# Patient Record
Sex: MALE | Race: WHITE | URBAN - NONMETROPOLITAN AREA
[De-identification: names, ages, dates, MRNs, and addresses within clinical notes are randomized per-mention and may not be internally consistent; named-entity substitution may affect disease eponyms.]

---

## 2022-07-01 ENCOUNTER — OFFICE VISIT (OUTPATIENT)
Dept: URBAN - NONMETROPOLITAN AREA CLINIC 4 | Facility: CLINIC | Age: 47
End: 2022-07-01
Payer: COMMERCIAL

## 2022-07-01 ENCOUNTER — LAB OUTSIDE AN ENCOUNTER (OUTPATIENT)
Dept: URBAN - NONMETROPOLITAN AREA CLINIC 4 | Facility: CLINIC | Age: 47
End: 2022-07-01

## 2022-07-01 VITALS
DIASTOLIC BLOOD PRESSURE: 93 MMHG | HEART RATE: 84 BPM | BODY MASS INDEX: 33.9 KG/M2 | HEIGHT: 70 IN | WEIGHT: 236.8 LBS | SYSTOLIC BLOOD PRESSURE: 162 MMHG | TEMPERATURE: 97.5 F

## 2022-07-01 DIAGNOSIS — Z12.11 COLON CANCER SCREENING: ICD-10-CM

## 2022-07-01 PROCEDURE — 99203 OFFICE O/P NEW LOW 30 MIN: CPT | Performed by: REGISTERED NURSE

## 2022-07-01 RX ORDER — SODIUM, POTASSIUM,MAG SULFATES 17.5-3.13G
177ML SOLUTION, RECONSTITUTED, ORAL ORAL ONCE A DAY
Qty: 354 ML | Refills: 0 | OUTPATIENT
Start: 2022-07-01 | End: 2022-07-03

## 2022-07-01 RX ORDER — DULOXETINE HYDROCHLORIDE 60 MG/1
1 CAPSULE CAPSULE, DELAYED RELEASE ORAL ONCE A DAY
Status: ACTIVE | COMMUNITY

## 2022-07-01 RX ORDER — GABAPENTIN 300 MG/1
1 CAPSULE CAPSULE ORAL ONCE A DAY
Status: ACTIVE | COMMUNITY

## 2022-07-01 RX ORDER — ATORVASTATIN CALCIUM 10 MG/1
1 TABLET TABLET, FILM COATED ORAL ONCE A DAY
Status: ACTIVE | COMMUNITY

## 2022-07-01 NOTE — HPI-TODAY'S VISIT:
7/1/22: Pt is a 46 yo male with PMH of anxiety and depression who was referred by SHERICE Montalvo, for screening colonoscopy.  A copy of this document will be sent to the referring provider.  The patient presents for a colon cancer screening. There is no family history of colon polyps or cancer.  Patient reports alternating diarrhea and constipation. He has noticed blood on toilet paper when he wipes from suspected hemorrhoids. Denies abdominal pain or unintentional weight loss. Patient denies bleeding per rectum. Noton any blood thinners.

## 2022-07-25 ENCOUNTER — OFFICE VISIT (OUTPATIENT)
Dept: URBAN - METROPOLITAN AREA SURGERY CENTER 14 | Facility: SURGERY CENTER | Age: 47
End: 2022-07-25
Payer: COMMERCIAL

## 2022-07-25 ENCOUNTER — CLAIMS CREATED FROM THE CLAIM WINDOW (OUTPATIENT)
Dept: URBAN - METROPOLITAN AREA CLINIC 4 | Facility: CLINIC | Age: 47
End: 2022-07-25
Payer: COMMERCIAL

## 2022-07-25 DIAGNOSIS — D12.0 BENIGN NEOPLASM OF CECUM: ICD-10-CM

## 2022-07-25 DIAGNOSIS — Z12.11 COLON CANCER SCREENING: ICD-10-CM

## 2022-07-25 DIAGNOSIS — D12.0 ADENOMA OF CECUM: ICD-10-CM

## 2022-07-25 PROCEDURE — 88305 TISSUE EXAM BY PATHOLOGIST: CPT | Performed by: PATHOLOGY

## 2022-07-25 PROCEDURE — 45385 COLONOSCOPY W/LESION REMOVAL: CPT | Performed by: INTERNAL MEDICINE

## 2022-07-25 PROCEDURE — G8907 PT DOC NO EVENTS ON DISCHARG: HCPCS | Performed by: INTERNAL MEDICINE

## 2022-07-25 RX ORDER — DULOXETINE HYDROCHLORIDE 60 MG/1
1 CAPSULE CAPSULE, DELAYED RELEASE ORAL ONCE A DAY
Status: ACTIVE | COMMUNITY

## 2022-07-25 RX ORDER — GABAPENTIN 300 MG/1
1 CAPSULE CAPSULE ORAL ONCE A DAY
Status: ACTIVE | COMMUNITY

## 2022-07-25 RX ORDER — ATORVASTATIN CALCIUM 10 MG/1
1 TABLET TABLET, FILM COATED ORAL ONCE A DAY
Status: ACTIVE | COMMUNITY

## 2022-08-15 ENCOUNTER — WEB ENCOUNTER (OUTPATIENT)
Dept: URBAN - NONMETROPOLITAN AREA CLINIC 4 | Facility: CLINIC | Age: 47
End: 2022-08-15

## 2022-08-15 ENCOUNTER — OFFICE VISIT (OUTPATIENT)
Dept: URBAN - NONMETROPOLITAN AREA CLINIC 4 | Facility: CLINIC | Age: 47
End: 2022-08-15
Payer: COMMERCIAL

## 2022-08-15 VITALS
TEMPERATURE: 96.4 F | HEART RATE: 94 BPM | HEIGHT: 70 IN | WEIGHT: 240.4 LBS | SYSTOLIC BLOOD PRESSURE: 145 MMHG | BODY MASS INDEX: 34.41 KG/M2 | DIASTOLIC BLOOD PRESSURE: 86 MMHG

## 2022-08-15 DIAGNOSIS — Z86.010 PERSONAL HISTORY OF COLONIC POLYPS: ICD-10-CM

## 2022-08-15 DIAGNOSIS — D12.6 ADENOMATOUS POLYP OF COLON, UNSPECIFIED PART OF COLON: ICD-10-CM

## 2022-08-15 PROBLEM — 428283002: Status: ACTIVE | Noted: 2022-08-15

## 2022-08-15 PROCEDURE — 99213 OFFICE O/P EST LOW 20 MIN: CPT | Performed by: REGISTERED NURSE

## 2022-08-15 RX ORDER — ATORVASTATIN CALCIUM 10 MG/1
1 TABLET TABLET, FILM COATED ORAL ONCE A DAY
Status: ACTIVE | COMMUNITY

## 2022-08-15 RX ORDER — GABAPENTIN 300 MG/1
1 CAPSULE CAPSULE ORAL ONCE A DAY
Status: ACTIVE | COMMUNITY

## 2022-08-15 RX ORDER — DULOXETINE HYDROCHLORIDE 60 MG/1
1 CAPSULE CAPSULE, DELAYED RELEASE ORAL ONCE A DAY
Status: ACTIVE | COMMUNITY

## 2022-08-15 NOTE — HPI-TODAY'S VISIT:
7/1/22: Pt is a 48 yo male with PMH of anxiety and depression who was referred by SHERICE Montalvo, for screening colonoscopy.  A copy of this document will be sent to the referring provider.  The patient presents for a colon cancer screening. There is no family history of colon polyps or cancer.  Patient reports alternating diarrhea and constipation. He has noticed blood on toilet paper when he wipes from suspected hemorrhoids. Denies abdominal pain or unintentional weight loss. Patient denies bleeding per rectum. Noton any blood thinners.  8/15/22: Pt RTC for f/u. Had cscope 7/25/22: - Internal hemorrhoids. - One 4 mm polyp in the cecum, removed with a cold snare. Resected and retrieved. - The examined portion of the ileum was normal Bx c/w adenomatous polyp Denies any current GI complaints.

## 2022-08-22 NOTE — PHYSICAL EXAM CHEST:
chest wall non-tender, breathing is unlabored without accessory muscle use, normal breath sounds Klisyri Pregnancy And Lactation Text: It is unknown if this medication can harm a developing fetus or if it is excreted in breast milk.

## 2023-10-12 ENCOUNTER — OFFICE VISIT (OUTPATIENT)
Dept: URBAN - NONMETROPOLITAN AREA CLINIC 4 | Facility: CLINIC | Age: 48
End: 2023-10-12
Payer: COMMERCIAL

## 2023-10-12 VITALS
HEART RATE: 78 BPM | BODY MASS INDEX: 34.33 KG/M2 | DIASTOLIC BLOOD PRESSURE: 78 MMHG | SYSTOLIC BLOOD PRESSURE: 124 MMHG | HEIGHT: 70 IN | WEIGHT: 239.8 LBS | TEMPERATURE: 97.7 F

## 2023-10-12 DIAGNOSIS — K52.89 OTHER SPECIFIED NONINFECTIVE GASTROENTERITIS AND COLITIS: ICD-10-CM

## 2023-10-12 DIAGNOSIS — Z86.010 PERSONAL HISTORY OF COLONIC POLYPS: ICD-10-CM

## 2023-10-12 PROCEDURE — 99214 OFFICE O/P EST MOD 30 MIN: CPT | Performed by: REGISTERED NURSE

## 2023-10-12 RX ORDER — DULOXETINE HYDROCHLORIDE 60 MG/1
1 CAPSULE CAPSULE, DELAYED RELEASE ORAL ONCE A DAY
Status: ACTIVE | COMMUNITY

## 2023-10-12 RX ORDER — GABAPENTIN 300 MG/1
1 CAPSULE CAPSULE ORAL ONCE A DAY
Status: ACTIVE | COMMUNITY

## 2023-10-12 RX ORDER — ATORVASTATIN CALCIUM 10 MG/1
1 TABLET TABLET, FILM COATED ORAL ONCE A DAY
Status: ACTIVE | COMMUNITY

## 2023-10-12 RX ORDER — DICYCLOMINE HYDROCHLORIDE 20 MG/1
TABLET ORAL
Qty: 20 TABLET | Status: ACTIVE | COMMUNITY

## 2023-10-12 RX ORDER — ONDANSETRON 4 MG/1
PLACE ONE TABLET ON THE TONGUE EVERY 6 HOURS AS NEEDED TABLET, ORALLY DISINTEGRATING ORAL
Qty: 12 UNSPECIFIED | Refills: 0 | Status: ACTIVE | COMMUNITY

## 2023-10-12 RX ORDER — METRONIDAZOLE 500 MG/1
TAKE ONE TABLET BY MOUTH TWICE A DAY FOR 10 DAYS TABLET, FILM COATED ORAL
Qty: 20 UNSPECIFIED | Refills: 0 | Status: ACTIVE | COMMUNITY

## 2023-10-12 RX ORDER — CIPROFLOXACIN HYDROCHLORIDE 500 MG/1
TAKE ONE TABLET BY MOUTH TWICE A DAY FOR 10 DAYS TABLET, FILM COATED ORAL
Qty: 20 UNSPECIFIED | Refills: 0 | Status: ACTIVE | COMMUNITY

## 2023-10-12 NOTE — HPI-TODAY'S VISIT:
7/1/22: Pt is a 46 yo male with PMH of anxiety and depression who was referred by SHERICE Montalvo, for screening colonoscopy.  A copy of this document will be sent to the referring provider.  The patient presents for a colon cancer screening. There is no family history of colon polyps or cancer.  Patient reports alternating diarrhea and constipation. He has noticed blood on toilet paper when he wipes from suspected hemorrhoids. Denies abdominal pain or unintentional weight loss. Patient denies bleeding per rectum. Noton any blood thinners.  8/15/22: Pt RTC for f/u. Had cscope 7/25/22: - Internal hemorrhoids. - One 4 mm polyp in the cecum, removed with a cold snare. Resected and retrieved. - The examined portion of the ileum was normal Bx c/w adenomatous polyp Denies any current GI complaints.  10/12/23: Pt RTC for hospital follow up. States he felt sick to his stomach with nausea, vomiting, subjective fever, lower abdominal pain and diarrhea on 10/1/23. Denies any hematochezia. Symptoms resolved except for diarrhea. This past Saturday, he felt like no stool would come out and he had the sensation that something was in his rectal vault. He tried a few enemas and nothing resulted. He went to [facility] ED on 10/7/23. KUB showed a moderate amount stool projects over the proximal and mid colon. The distal colon is relatively decompressed. After discharge, diarrhea persisted. He went to [facility] ED on 10/9/23. CT scan showed increased wall thickening, edema and hyperemia of the rectum and sigmoid colon concerning for inflammatory bowel disease such as ulcerative colitis. Infectious proctocolitis is also in the differential. He was discahrged home on Cipro, Flagyl, Bentyl and Zofran. He is feeling much better. Has not had any bowel movements today thus far.

## 2023-10-25 ENCOUNTER — OFFICE VISIT (OUTPATIENT)
Dept: URBAN - NONMETROPOLITAN AREA CLINIC 4 | Facility: CLINIC | Age: 48
End: 2023-10-25
Payer: COMMERCIAL

## 2023-10-25 ENCOUNTER — LAB OUTSIDE AN ENCOUNTER (OUTPATIENT)
Dept: URBAN - NONMETROPOLITAN AREA CLINIC 4 | Facility: CLINIC | Age: 48
End: 2023-10-25

## 2023-10-25 VITALS
HEIGHT: 70 IN | HEART RATE: 80 BPM | BODY MASS INDEX: 34.1 KG/M2 | DIASTOLIC BLOOD PRESSURE: 77 MMHG | TEMPERATURE: 97.9 F | SYSTOLIC BLOOD PRESSURE: 128 MMHG | WEIGHT: 238.2 LBS

## 2023-10-25 DIAGNOSIS — Z86.010 PERSONAL HISTORY OF COLONIC POLYPS: ICD-10-CM

## 2023-10-25 DIAGNOSIS — R19.7 DIARRHEA, UNSPECIFIED TYPE: ICD-10-CM

## 2023-10-25 DIAGNOSIS — D12.6 ADENOMATOUS POLYP OF COLON, UNSPECIFIED PART OF COLON: ICD-10-CM

## 2023-10-25 DIAGNOSIS — K52.9 COLITIS: ICD-10-CM

## 2023-10-25 DIAGNOSIS — K52.89 OTHER SPECIFIED NONINFECTIVE GASTROENTERITIS AND COLITIS: ICD-10-CM

## 2023-10-25 PROCEDURE — 99214 OFFICE O/P EST MOD 30 MIN: CPT | Performed by: REGISTERED NURSE

## 2023-10-25 RX ORDER — ONDANSETRON 4 MG/1
PLACE ONE TABLET ON THE TONGUE EVERY 6 HOURS AS NEEDED TABLET, ORALLY DISINTEGRATING ORAL
Qty: 12 UNSPECIFIED | Refills: 0 | Status: ACTIVE | COMMUNITY

## 2023-10-25 RX ORDER — ATORVASTATIN CALCIUM 10 MG/1
1 TABLET TABLET, FILM COATED ORAL ONCE A DAY
Status: ACTIVE | COMMUNITY

## 2023-10-25 RX ORDER — DICYCLOMINE HYDROCHLORIDE 20 MG/1
TABLET ORAL
Qty: 20 TABLET | Status: ACTIVE | COMMUNITY

## 2023-10-25 RX ORDER — CIPROFLOXACIN HYDROCHLORIDE 500 MG/1
TAKE ONE TABLET BY MOUTH TWICE A DAY FOR 10 DAYS TABLET, FILM COATED ORAL
Qty: 20 UNSPECIFIED | Refills: 0 | Status: ACTIVE | COMMUNITY

## 2023-10-25 RX ORDER — GABAPENTIN 300 MG/1
1 CAPSULE CAPSULE ORAL ONCE A DAY
Status: ACTIVE | COMMUNITY

## 2023-10-25 RX ORDER — SODIUM PICOSULFATE, MAGNESIUM OXIDE, AND ANHYDROUS CITRIC ACID 12; 3.5; 1 G/175ML; G/175ML; MG/175ML
175 ML THE FIRST DOSE THE EVENING BEFORE AND SECOND DOSE THE MORNING OF COLONOSCOPY LIQUID ORAL ONCE A DAY
Qty: 350 | Refills: 0 | OUTPATIENT
Start: 2023-10-25 | End: 2023-10-27

## 2023-10-25 RX ORDER — DULOXETINE HYDROCHLORIDE 60 MG/1
1 CAPSULE CAPSULE, DELAYED RELEASE ORAL ONCE A DAY
Status: ACTIVE | COMMUNITY

## 2023-10-25 RX ORDER — METRONIDAZOLE 500 MG/1
TAKE ONE TABLET BY MOUTH TWICE A DAY FOR 10 DAYS TABLET, FILM COATED ORAL
Qty: 20 UNSPECIFIED | Refills: 0 | Status: ACTIVE | COMMUNITY

## 2023-10-25 RX ORDER — DIPHENOXYLATE HYDROCHLORIDE AND ATROPINE SULFATE 2.5; .025 MG/1; MG/1
1 TABLET AS NEEDED TABLET ORAL
Status: ACTIVE | COMMUNITY

## 2023-10-25 NOTE — HPI-TODAY'S VISIT:
7/1/22: Pt is a 48 yo male with PMH of anxiety and depression who was referred by SHERICE Montalvo, for screening colonoscopy.  A copy of this document will be sent to the referring provider.  The patient presents for a colon cancer screening. There is no family history of colon polyps or cancer.  Patient reports alternating diarrhea and constipation. He has noticed blood on toilet paper when he wipes from suspected hemorrhoids. Denies abdominal pain or unintentional weight loss. Patient denies bleeding per rectum. Noton any blood thinners.  8/15/22: Pt RTC for f/u. Had cscope 7/25/22: - Internal hemorrhoids. - One 4 mm polyp in the cecum, removed with a cold snare. Resected and retrieved. - The examined portion of the ileum was normal Bx c/w adenomatous polyp Denies any current GI complaints.  10/12/23: Pt RTC for hospital follow up. States he felt sick to his stomach with nausea, vomiting, subjective fever, lower abdominal pain and diarrhea on 10/1/23. Denies any hematochezia. Symptoms resolved except for diarrhea. This past Saturday, he felt like no stool would come out and he had the sensation that something was in his rectal vault. He tried a few enemas and nothing resulted. He went to [facility] ED on 10/7/23. KUB showed a moderate amount stool projects over the proximal and mid colon. The distal colon is relatively decompressed. After discharge, diarrhea persisted. He went to [facility] ED on 10/9/23. CT scan showed increased wall thickening, edema and hyperemia of the rectum and sigmoid colon concerning for inflammatory bowel disease such as ulcerative colitis. Infectious proctocolitis is also in the differential. He was discahrged home on Cipro, Flagyl, Bentyl and Zofran. He is feeling much better. Has not had any bowel movements today thus far.  10/25/23: Pt RTC with c/o recurrent watery diarrhea. States his diarrhea had resolved, but returned this past Monday night. Reports associated abdominal pain, bloating, gas, fever, chills, muscle aches. No change in his diet. No known bad food exposure. No aggravating or alleviating factors. He was seen at [place] ED. CBC wnl. He was discharged home on Lomotil which has helped. Since discharge, he continues to c/o diarrhea, somewhat better. Has had 10 BMs today. He continues to c/o abdominal pain and muscle aches. Denies hematochezia.

## 2023-11-03 ENCOUNTER — CLAIMS CREATED FROM THE CLAIM WINDOW (OUTPATIENT)
Dept: URBAN - METROPOLITAN AREA SURGERY CENTER 14 | Facility: SURGERY CENTER | Age: 48
End: 2023-11-03
Payer: COMMERCIAL

## 2023-11-03 ENCOUNTER — CLAIMS CREATED FROM THE CLAIM WINDOW (OUTPATIENT)
Dept: URBAN - METROPOLITAN AREA CLINIC 4 | Facility: CLINIC | Age: 48
End: 2023-11-03
Payer: COMMERCIAL

## 2023-11-03 DIAGNOSIS — K64.0 GRADE I INTERNAL HEMORRHOIDS: ICD-10-CM

## 2023-11-03 DIAGNOSIS — K51.00 ULCERATIVE CHRONIC PANCOLITIS WITHOUT COMPLICATIONS: ICD-10-CM

## 2023-11-03 DIAGNOSIS — K51.919 ULCERATIVE COLITIS, UNSPECIFIED WITH UNSPECIFIED COMPLICATIONS: ICD-10-CM

## 2023-11-03 DIAGNOSIS — K51.90 ULCERATIVE COLITIS: ICD-10-CM

## 2023-11-03 PROCEDURE — 00811 ANES LWR INTST NDSC NOS: CPT | Performed by: NURSE ANESTHETIST, CERTIFIED REGISTERED

## 2023-11-03 PROCEDURE — 88305 TISSUE EXAM BY PATHOLOGIST: CPT | Performed by: PATHOLOGY

## 2023-11-03 PROCEDURE — 45380 COLONOSCOPY AND BIOPSY: CPT | Performed by: INTERNAL MEDICINE

## 2023-11-03 PROCEDURE — G8907 PT DOC NO EVENTS ON DISCHARG: HCPCS | Performed by: INTERNAL MEDICINE

## 2023-11-03 RX ORDER — MESALAMINE 1.2 G/1
3 TABLETS WITH A MEAL TABLET, DELAYED RELEASE ORAL ONCE A DAY
Qty: 90 TABLET | Refills: 11 | OUTPATIENT
Start: 2023-11-03 | End: 2024-10-28

## 2023-11-03 RX ORDER — ONDANSETRON 4 MG/1
PLACE ONE TABLET ON THE TONGUE EVERY 6 HOURS AS NEEDED TABLET, ORALLY DISINTEGRATING ORAL
Qty: 12 UNSPECIFIED | Refills: 0 | Status: ACTIVE | COMMUNITY

## 2023-11-03 RX ORDER — PREDNISONE 10 MG/1
4 DAILY 1 WEEK THEN 3 DAILY 1 WEEK THEN 2 DAILY 1 WEEK THEN 1 DAILY 1 WEEK TABLET ORAL ONCE A DAY
Qty: 70 | Refills: 0 | OUTPATIENT
Start: 2023-11-03 | End: 2023-12-02

## 2023-11-03 RX ORDER — DICYCLOMINE HYDROCHLORIDE 20 MG/1
TABLET ORAL
Qty: 20 TABLET | Status: ACTIVE | COMMUNITY

## 2023-11-03 RX ORDER — DIPHENOXYLATE HYDROCHLORIDE AND ATROPINE SULFATE 2.5; .025 MG/1; MG/1
1 TABLET AS NEEDED TABLET ORAL
Status: ACTIVE | COMMUNITY

## 2023-11-03 RX ORDER — DULOXETINE HYDROCHLORIDE 60 MG/1
1 CAPSULE CAPSULE, DELAYED RELEASE ORAL ONCE A DAY
Status: ACTIVE | COMMUNITY

## 2023-11-03 RX ORDER — METRONIDAZOLE 500 MG/1
TAKE ONE TABLET BY MOUTH TWICE A DAY FOR 10 DAYS TABLET, FILM COATED ORAL
Qty: 20 UNSPECIFIED | Refills: 0 | Status: ACTIVE | COMMUNITY

## 2023-11-03 RX ORDER — CIPROFLOXACIN HYDROCHLORIDE 500 MG/1
TAKE ONE TABLET BY MOUTH TWICE A DAY FOR 10 DAYS TABLET, FILM COATED ORAL
Qty: 20 UNSPECIFIED | Refills: 0 | Status: ACTIVE | COMMUNITY

## 2023-11-03 RX ORDER — GABAPENTIN 300 MG/1
1 CAPSULE CAPSULE ORAL ONCE A DAY
Status: ACTIVE | COMMUNITY

## 2023-11-03 RX ORDER — ATORVASTATIN CALCIUM 10 MG/1
1 TABLET TABLET, FILM COATED ORAL ONCE A DAY
Status: ACTIVE | COMMUNITY

## 2023-11-07 ENCOUNTER — TELEPHONE ENCOUNTER (OUTPATIENT)
Dept: URBAN - METROPOLITAN AREA CLINIC 54 | Facility: CLINIC | Age: 48
End: 2023-11-07

## 2023-11-07 RX ORDER — VANCOMYCIN HYDROCHLORIDE 125 MG/1
1 CAPSULE CAPSULE ORAL
Qty: 40 | OUTPATIENT
Start: 2023-11-07 | End: 2023-11-17

## 2023-11-08 LAB
CALPROTECTIN, FECAL: 2880
CAMPYLOBACTER GROUP: NOT DETECTED
CLOSTRIDIUM DIFFICILE: (no result)
NOROVIRUS GI/GII: NOT DETECTED
PANCREATIC ELASTASE, FECAL: >500
ROTAVIRUS A: NOT DETECTED
SALMONELLA SPECIES: NOT DETECTED
SHIGA TOXIN 1: NOT DETECTED
SHIGA TOXIN 2: NOT DETECTED
SHIGELLA SPECIES: NOT DETECTED
VIBRIO GROUP: NOT DETECTED
YERSINIA ENTEROCOLITICA: NOT DETECTED

## 2023-12-05 ENCOUNTER — P2P PATIENT RECORD (OUTPATIENT)
Age: 48
End: 2023-12-05

## 2023-12-05 ENCOUNTER — OFFICE VISIT (OUTPATIENT)
Dept: URBAN - NONMETROPOLITAN AREA CLINIC 4 | Facility: CLINIC | Age: 48
End: 2023-12-05
Payer: COMMERCIAL

## 2023-12-05 VITALS
DIASTOLIC BLOOD PRESSURE: 79 MMHG | TEMPERATURE: 97.9 F | HEIGHT: 70 IN | WEIGHT: 251 LBS | HEART RATE: 83 BPM | SYSTOLIC BLOOD PRESSURE: 142 MMHG | BODY MASS INDEX: 35.93 KG/M2

## 2023-12-05 DIAGNOSIS — A04.72 CLOSTRIDIUM DIFFICILE COLITIS: ICD-10-CM

## 2023-12-05 DIAGNOSIS — K51.00 ULCERATIVE PANCOLITIS: ICD-10-CM

## 2023-12-05 PROBLEM — 444548001: Status: ACTIVE | Noted: 2023-12-05

## 2023-12-05 PROCEDURE — 99214 OFFICE O/P EST MOD 30 MIN: CPT | Performed by: REGISTERED NURSE

## 2023-12-05 RX ORDER — DICYCLOMINE HYDROCHLORIDE 20 MG/1
TABLET ORAL
Qty: 20 TABLET | Status: ACTIVE | COMMUNITY

## 2023-12-05 RX ORDER — DULOXETINE HYDROCHLORIDE 60 MG/1
1 CAPSULE CAPSULE, DELAYED RELEASE ORAL ONCE A DAY
Status: ACTIVE | COMMUNITY

## 2023-12-05 RX ORDER — MESALAMINE 1.2 G/1
3 TABLETS WITH A MEAL TABLET, DELAYED RELEASE ORAL ONCE A DAY
Qty: 90 TABLET | Refills: 11 | Status: ACTIVE | COMMUNITY
Start: 2023-11-03 | End: 2024-10-28

## 2023-12-05 RX ORDER — GABAPENTIN 300 MG/1
1 CAPSULE CAPSULE ORAL ONCE A DAY
Status: ACTIVE | COMMUNITY

## 2023-12-05 RX ORDER — CIPROFLOXACIN HYDROCHLORIDE 500 MG/1
TAKE ONE TABLET BY MOUTH TWICE A DAY FOR 10 DAYS TABLET, FILM COATED ORAL
Qty: 20 UNSPECIFIED | Refills: 0 | Status: ACTIVE | COMMUNITY

## 2023-12-05 RX ORDER — METRONIDAZOLE 500 MG/1
TAKE ONE TABLET BY MOUTH TWICE A DAY FOR 10 DAYS TABLET, FILM COATED ORAL
Qty: 20 UNSPECIFIED | Refills: 0 | Status: ACTIVE | COMMUNITY

## 2023-12-05 RX ORDER — ATORVASTATIN CALCIUM 10 MG/1
1 TABLET TABLET, FILM COATED ORAL ONCE A DAY
Status: ACTIVE | COMMUNITY

## 2023-12-05 RX ORDER — DIPHENOXYLATE HYDROCHLORIDE AND ATROPINE SULFATE 2.5; .025 MG/1; MG/1
1 TABLET AS NEEDED TABLET ORAL
Status: ACTIVE | COMMUNITY

## 2023-12-05 RX ORDER — ONDANSETRON 4 MG/1
PLACE ONE TABLET ON THE TONGUE EVERY 6 HOURS AS NEEDED TABLET, ORALLY DISINTEGRATING ORAL
Qty: 12 UNSPECIFIED | Refills: 0 | Status: ACTIVE | COMMUNITY

## 2023-12-05 NOTE — HPI-TODAY'S VISIT:
7/1/22: Pt is a 48 yo male with PMH of anxiety and depression who was referred by SHERICE Montalvo, for screening colonoscopy.  A copy of this document will be sent to the referring provider.  The patient presents for a colon cancer screening. There is no family history of colon polyps or cancer.  Patient reports alternating diarrhea and constipation. He has noticed blood on toilet paper when he wipes from suspected hemorrhoids. Denies abdominal pain or unintentional weight loss. Patient denies bleeding per rectum. Noton any blood thinners.  8/15/22: Pt RTC for f/u. Had cscope 7/25/22: - Internal hemorrhoids. - One 4 mm polyp in the cecum, removed with a cold snare. Resected and retrieved. - The examined portion of the ileum was normal Bx c/w adenomatous polyp Denies any current GI complaints.  10/12/23: Pt RTC for hospital follow up. States he felt sick to his stomach with nausea, vomiting, subjective fever, lower abdominal pain and diarrhea on 10/1/23. Denies any hematochezia. Symptoms resolved except for diarrhea. This past Saturday, he felt like no stool would come out and he had the sensation that something was in his rectal vault. He tried a few enemas and nothing resulted. He went to [facility] ED on 10/7/23. KUB showed a moderate amount stool projects over the proximal and mid colon. The distal colon is relatively decompressed. After discharge, diarrhea persisted. He went to [facility] ED on 10/9/23. CT scan showed increased wall thickening, edema and hyperemia of the rectum and sigmoid colon concerning for inflammatory bowel disease such as ulcerative colitis. Infectious proctocolitis is also in the differential. He was discahrged home on Cipro, Flagyl, Bentyl and Zofran. He is feeling much better. Has not had any bowel movements today thus far.  10/25/23: Pt RTC with c/o recurrent watery diarrhea. States his diarrhea had resolved, but returned this past Monday night. Reports associated abdominal pain, bloating, gas, fever, chills, muscle aches. No change in his diet. No known bad food exposure. No aggravating or alleviating factors. He was seen at [place] ED. CBC wnl. He was discharged home on Lomotil which has helped. Since discharge, he continues to c/o diarrhea, somewhat better. Has had 10 BMs today. He continues to c/o abdominal pain and muscle aches. Denies hematochezia.  12/5/23: Pt RTC for f/u. His C. Diff test was positive on 11/1/23. He completed Vanco. He had a cscope on 11/3/23: - Moderately active (Rahman Score 2) pancolitis. Biopsied. - The examined portion of the ileum was normal.- Internal hemorrhoids. Bx c/w chronic active colitis   He started taking mesalamine after his cscope. He denies any further diarrhea or abdominal pain. Having 1-2 non-bloody BMs daily. No other GI complaints.

## 2024-03-05 ENCOUNTER — OV EP (OUTPATIENT)
Dept: URBAN - NONMETROPOLITAN AREA CLINIC 4 | Facility: CLINIC | Age: 49
End: 2024-03-05

## 2024-03-11 ENCOUNTER — OV EP (OUTPATIENT)
Dept: URBAN - NONMETROPOLITAN AREA CLINIC 4 | Facility: CLINIC | Age: 49
End: 2024-03-11
Payer: COMMERCIAL

## 2024-03-11 VITALS
SYSTOLIC BLOOD PRESSURE: 129 MMHG | DIASTOLIC BLOOD PRESSURE: 83 MMHG | HEART RATE: 82 BPM | BODY MASS INDEX: 38.22 KG/M2 | TEMPERATURE: 98.2 F | HEIGHT: 70 IN | WEIGHT: 267 LBS

## 2024-03-11 DIAGNOSIS — K52.9 ACUTE AND CHRONIC COLITIS: ICD-10-CM

## 2024-03-11 DIAGNOSIS — Z86.010 PERSONAL HISTORY OF COLONIC POLYPS: ICD-10-CM

## 2024-03-11 DIAGNOSIS — Z86.19 HISTORY OF CLOSTRIDIUM DIFFICILE COLITIS: ICD-10-CM

## 2024-03-11 PROCEDURE — 99213 OFFICE O/P EST LOW 20 MIN: CPT | Performed by: REGISTERED NURSE

## 2024-03-11 RX ORDER — MESALAMINE 1.2 G/1
3 TABLETS WITH A MEAL TABLET, DELAYED RELEASE ORAL ONCE A DAY
Qty: 90 TABLET | Refills: 11 | Status: ACTIVE | COMMUNITY
Start: 2023-11-03 | End: 2024-10-28

## 2024-03-11 RX ORDER — DIPHENOXYLATE HYDROCHLORIDE AND ATROPINE SULFATE 2.5; .025 MG/1; MG/1
1 TABLET AS NEEDED TABLET ORAL
Status: ACTIVE | COMMUNITY

## 2024-03-11 RX ORDER — DULOXETINE HYDROCHLORIDE 60 MG/1
1 CAPSULE CAPSULE, DELAYED RELEASE ORAL ONCE A DAY
Status: ACTIVE | COMMUNITY

## 2024-03-11 RX ORDER — ONDANSETRON 4 MG/1
PLACE ONE TABLET ON THE TONGUE EVERY 6 HOURS AS NEEDED TABLET, ORALLY DISINTEGRATING ORAL
Qty: 12 UNSPECIFIED | Refills: 0 | Status: ACTIVE | COMMUNITY

## 2024-03-11 RX ORDER — CIPROFLOXACIN HYDROCHLORIDE 500 MG/1
TAKE ONE TABLET BY MOUTH TWICE A DAY FOR 10 DAYS TABLET, FILM COATED ORAL
Qty: 20 UNSPECIFIED | Refills: 0 | Status: ACTIVE | COMMUNITY

## 2024-03-11 RX ORDER — ATORVASTATIN CALCIUM 10 MG/1
1 TABLET TABLET, FILM COATED ORAL ONCE A DAY
Status: ACTIVE | COMMUNITY

## 2024-03-11 RX ORDER — METRONIDAZOLE 500 MG/1
TAKE ONE TABLET BY MOUTH TWICE A DAY FOR 10 DAYS TABLET, FILM COATED ORAL
Qty: 20 UNSPECIFIED | Refills: 0 | Status: ACTIVE | COMMUNITY

## 2024-03-11 RX ORDER — GABAPENTIN 300 MG/1
1 CAPSULE CAPSULE ORAL ONCE A DAY
Status: ACTIVE | COMMUNITY

## 2024-03-11 RX ORDER — DICYCLOMINE HYDROCHLORIDE 20 MG/1
TABLET ORAL
Qty: 20 TABLET | Status: ACTIVE | COMMUNITY

## 2024-03-11 NOTE — HPI-TODAY'S VISIT:
7/1/22: Pt is a 46 yo male with PMH of anxiety and depression who was referred by SHERICE Montalvo, for screening colonoscopy.  A copy of this document will be sent to the referring provider.  The patient presents for a colon cancer screening. There is no family history of colon polyps or cancer.  Patient reports alternating diarrhea and constipation. He has noticed blood on toilet paper when he wipes from suspected hemorrhoids. Denies abdominal pain or unintentional weight loss. Patient denies bleeding per rectum. Noton any blood thinners.  8/15/22: Pt RTC for f/u. Had cscope 7/25/22: - Internal hemorrhoids. - One 4 mm polyp in the cecum, removed with a cold snare. Resected and retrieved. - The examined portion of the ileum was normal Bx c/w adenomatous polyp Denies any current GI complaints.  10/12/23: Pt RTC for hospital follow up. States he felt sick to his stomach with nausea, vomiting, subjective fever, lower abdominal pain and diarrhea on 10/1/23. Denies any hematochezia. Symptoms resolved except for diarrhea. This past Saturday, he felt like no stool would come out and he had the sensation that something was in his rectal vault. He tried a few enemas and nothing resulted. He went to [facility] ED on 10/7/23. KUB showed a moderate amount stool projects over the proximal and mid colon. The distal colon is relatively decompressed. After discharge, diarrhea persisted. He went to [facility] ED on 10/9/23. CT scan showed increased wall thickening, edema and hyperemia of the rectum and sigmoid colon concerning for inflammatory bowel disease such as ulcerative colitis. Infectious proctocolitis is also in the differential. He was discahrged home on Cipro, Flagyl, Bentyl and Zofran. He is feeling much better. Has not had any bowel movements today thus far.  10/25/23: Pt RTC with c/o recurrent watery diarrhea. States his diarrhea had resolved, but returned this past Monday night. Reports associated abdominal pain, bloating, gas, fever, chills, muscle aches. No change in his diet. No known bad food exposure. No aggravating or alleviating factors. He was seen at [place] ED. CBC wnl. He was discharged home on Lomotil which has helped. Since discharge, he continues to c/o diarrhea, somewhat better. Has had 10 BMs today. He continues to c/o abdominal pain and muscle aches. Denies hematochezia.  12/5/23: Pt RTC for f/u. His C. Diff test was positive on 11/1/23. He completed Vanco. He had a cscope on 11/3/23: - Moderately active (Rahman Score 2) pancolitis. Biopsied. - The examined portion of the ileum was normal. - Internal hemorrhoids. Bx c/w chronic active colitis   He started taking mesalamine after his cscope. He denies any further diarrhea or abdominal pain. Having 1-2 non-bloody BMs daily. No other GI complaints.  3/11/24: Pt RTC for f/u. Denies any recurrent diarrhea or abdominal pain. No other GI complaints.

## 2024-03-12 LAB — C-REACTIVE PROTEIN, QUANT: 2.3

## 2024-03-21 LAB — CALPROTECTIN, FECAL: 16

## 2024-06-28 ENCOUNTER — OFFICE VISIT (OUTPATIENT)
Dept: URBAN - NONMETROPOLITAN AREA CLINIC 4 | Facility: CLINIC | Age: 49
End: 2024-06-28
Payer: COMMERCIAL

## 2024-06-28 ENCOUNTER — DASHBOARD ENCOUNTERS (OUTPATIENT)
Age: 49
End: 2024-06-28

## 2024-06-28 VITALS
WEIGHT: 257.2 LBS | HEART RATE: 78 BPM | TEMPERATURE: 98.1 F | SYSTOLIC BLOOD PRESSURE: 146 MMHG | BODY MASS INDEX: 36.82 KG/M2 | DIASTOLIC BLOOD PRESSURE: 92 MMHG | HEIGHT: 70 IN

## 2024-06-28 DIAGNOSIS — K64.5 INTERNAL THROMBOSED HEMORRHOIDS: ICD-10-CM

## 2024-06-28 DIAGNOSIS — Z86.19 HISTORY OF CLOSTRIDIUM DIFFICILE COLITIS: ICD-10-CM

## 2024-06-28 DIAGNOSIS — Z86.010 PERSONAL HISTORY OF COLONIC POLYPS: ICD-10-CM

## 2024-06-28 DIAGNOSIS — D12.6 ADENOMATOUS POLYP OF COLON, UNSPECIFIED PART OF COLON: ICD-10-CM

## 2024-06-28 DIAGNOSIS — K52.9 COLITIS: ICD-10-CM

## 2024-06-28 PROCEDURE — 99213 OFFICE O/P EST LOW 20 MIN: CPT | Performed by: REGISTERED NURSE

## 2024-06-28 RX ORDER — DULOXETINE HYDROCHLORIDE 60 MG/1
TAKE ONE CAPSULE BY MOUTH ONE TIME DAILY CAPSULE, DELAYED RELEASE PELLETS ORAL
Qty: 90 UNSPECIFIED | Refills: 0 | Status: ACTIVE | COMMUNITY

## 2024-06-28 RX ORDER — ONDANSETRON 4 MG/1
PLACE ONE TABLET ON THE TONGUE EVERY 6 HOURS AS NEEDED TABLET, ORALLY DISINTEGRATING ORAL
Qty: 12 UNSPECIFIED | Refills: 0 | Status: ON HOLD | COMMUNITY

## 2024-06-28 RX ORDER — DIPHENOXYLATE HYDROCHLORIDE AND ATROPINE SULFATE 2.5; .025 MG/1; MG/1
1 TABLET AS NEEDED TABLET ORAL
Status: ON HOLD | COMMUNITY

## 2024-06-28 RX ORDER — ATORVASTATIN CALCIUM 20 MG/1
TAKE ONE TABLET BY MOUTH EVERY NIGHT TABLET, FILM COATED ORAL
Qty: 90 UNSPECIFIED | Refills: 0 | Status: ACTIVE | COMMUNITY

## 2024-06-28 RX ORDER — ARIPIPRAZOLE 5 MG/1
TAKE ONE TABLET BY MOUTH ONE TIME DAILY TABLET ORAL
Qty: 90 UNSPECIFIED | Refills: 0 | Status: ACTIVE | COMMUNITY

## 2024-06-28 RX ORDER — GABAPENTIN 300 MG/1
TAKE ONE CAPSULE BY MOUTH AT BEDTIME FOR INSOMNIA AND LEG MOVEMENTS CAPSULE ORAL
Qty: 90 UNSPECIFIED | Refills: 0 | Status: ACTIVE | COMMUNITY

## 2024-06-28 RX ORDER — BUSPIRONE HYDROCHLORIDE 5 MG/1
TAKE ONE TABLET BY MOUTH TWICE A DAY TABLET ORAL
Qty: 180 UNSPECIFIED | Refills: 0 | Status: ACTIVE | COMMUNITY

## 2024-06-28 RX ORDER — FAMOTIDINE 20 MG/1
TAKE ONE TABLET BY MOUTH ONE TIME DAILY TABLET, FILM COATED ORAL
Qty: 90 UNSPECIFIED | Refills: 0 | Status: ACTIVE | COMMUNITY

## 2024-06-28 RX ORDER — METRONIDAZOLE 500 MG/1
TAKE ONE TABLET BY MOUTH TWICE A DAY FOR 10 DAYS TABLET, FILM COATED ORAL
Qty: 20 UNSPECIFIED | Refills: 0 | Status: ON HOLD | COMMUNITY

## 2024-06-28 RX ORDER — DICYCLOMINE HYDROCHLORIDE 20 MG/1
TABLET ORAL
Qty: 20 TABLET | Status: ACTIVE | COMMUNITY

## 2024-06-28 RX ORDER — MESALAMINE 1.2 G/1
3 TABLETS WITH A MEAL TABLET, DELAYED RELEASE ORAL ONCE A DAY
Qty: 90 TABLET | Refills: 11 | Status: ON HOLD | COMMUNITY
Start: 2023-11-03 | End: 2024-10-28

## 2024-06-28 RX ORDER — CIPROFLOXACIN HYDROCHLORIDE 500 MG/1
TAKE ONE TABLET BY MOUTH TWICE A DAY FOR 10 DAYS TABLET, FILM COATED ORAL
Qty: 20 UNSPECIFIED | Refills: 0 | Status: ON HOLD | COMMUNITY

## 2024-06-28 NOTE — HPI-TODAY'S VISIT:
7/1/22: Pt is a 48 yo male with PMH of anxiety and depression who was referred by SHERICE Montalvo, for screening colonoscopy.  A copy of this document will be sent to the referring provider.  The patient presents for a colon cancer screening. There is no family history of colon polyps or cancer.  Patient reports alternating diarrhea and constipation. He has noticed blood on toilet paper when he wipes from suspected hemorrhoids. Denies abdominal pain or unintentional weight loss. Patient denies bleeding per rectum. Noton any blood thinners.  8/15/22: Pt RTC for f/u. Had cscope 7/25/22: - Internal hemorrhoids. - One 4 mm polyp in the cecum, removed with a cold snare. Resected and retrieved. - The examined portion of the ileum was normal Bx c/w adenomatous polyp Denies any current GI complaints.  10/12/23: Pt RTC for hospital follow up. States he felt sick to his stomach with nausea, vomiting, subjective fever, lower abdominal pain and diarrhea on 10/1/23. Denies any hematochezia. Symptoms resolved except for diarrhea. This past Saturday, he felt like no stool would come out and he had the sensation that something was in his rectal vault. He tried a few enemas and nothing resulted. He went to [facility] ED on 10/7/23. KUB showed a moderate amount stool projects over the proximal and mid colon. The distal colon is relatively decompressed. After discharge, diarrhea persisted. He went to [facility] ED on 10/9/23. CT scan showed increased wall thickening, edema and hyperemia of the rectum and sigmoid colon concerning for inflammatory bowel disease such as ulcerative colitis. Infectious proctocolitis is also in the differential. He was discahrged home on Cipro, Flagyl, Bentyl and Zofran. He is feeling much better. Has not had any bowel movements today thus far.  10/25/23: Pt RTC with c/o recurrent watery diarrhea. States his diarrhea had resolved, but returned this past Monday night. Reports associated abdominal pain, bloating, gas, fever, chills, muscle aches. No change in his diet. No known bad food exposure. No aggravating or alleviating factors. He was seen at [place] ED. CBC wnl. He was discharged home on Lomotil which has helped. Since discharge, he continues to c/o diarrhea, somewhat better. Has had 10 BMs today. He continues to c/o abdominal pain and muscle aches. Denies hematochezia.  12/5/23: Pt RTC for f/u. His C. Diff test was positive on 11/1/23. He completed Vanco.  He had a cscope on 11/3/23: - Moderately active (Rahman Score 2) pancolitis. Biopsied. - The examined portion of the ileum was normal. - Internal hemorrhoids. Bx c/w chronic active colitis   He started taking mesalamine after his cscope. He denies any further diarrhea or abdominal pain. Having 1-2 non-bloody BMs daily. No other GI complaints.  3/11/24: Pt RTC for f/u. Denies any recurrent diarrhea or abdominal pain. No other GI complaints.  6/28/24: Pt RTC with c/o rectal bleeding that started 2-3 days ago. He also has rectal pain. Bleeding is described as moderate. Denies diarrhea or constipation. Not on NSAIDs, aspirin or blood thinners. Seen by PCP yesterday and LAURA revealed thrombosed internal hemorrhoids.